# Patient Record
Sex: FEMALE | Race: WHITE | Employment: FULL TIME | URBAN - METROPOLITAN AREA
[De-identification: names, ages, dates, MRNs, and addresses within clinical notes are randomized per-mention and may not be internally consistent; named-entity substitution may affect disease eponyms.]

---

## 2023-04-27 ENCOUNTER — OFFICE VISIT (OUTPATIENT)
Dept: ONCOLOGY | Age: 23
End: 2023-04-27
Payer: COMMERCIAL

## 2023-04-27 VITALS
TEMPERATURE: 98 F | HEIGHT: 68 IN | BODY MASS INDEX: 26.22 KG/M2 | SYSTOLIC BLOOD PRESSURE: 123 MMHG | DIASTOLIC BLOOD PRESSURE: 73 MMHG | RESPIRATION RATE: 18 BRPM | OXYGEN SATURATION: 98 % | HEART RATE: 87 BPM | WEIGHT: 173 LBS

## 2023-04-27 DIAGNOSIS — R79.89 ABNORMAL CBC: Primary | ICD-10-CM

## 2023-04-27 PROCEDURE — 99204 OFFICE O/P NEW MOD 45 MIN: CPT | Performed by: STUDENT IN AN ORGANIZED HEALTH CARE EDUCATION/TRAINING PROGRAM

## 2023-04-27 RX ORDER — ALBUTEROL SULFATE 1.25 MG/3ML
1 SOLUTION RESPIRATORY (INHALATION)
COMMUNITY

## 2023-04-27 RX ORDER — NORETHINDRONE ACETATE AND ETHINYL ESTRADIOL 1MG-20(21)
1 KIT ORAL DAILY
COMMUNITY

## 2023-04-27 RX ORDER — FLUTICASONE PROPIONATE AND SALMETEROL XINAFOATE 115; 21 UG/1; UG/1
2 AEROSOL, METERED RESPIRATORY (INHALATION) 2 TIMES DAILY
COMMUNITY

## 2023-04-27 RX ORDER — IRON POLYSACCHARIDE COMPLEX 150 MG
300 CAPSULE ORAL DAILY
COMMUNITY

## 2023-04-27 RX ORDER — BUSPIRONE HYDROCHLORIDE 7.5 MG/1
1 TABLET ORAL 2 TIMES DAILY
COMMUNITY
Start: 2023-04-13

## 2023-04-27 RX ORDER — DOXYCYCLINE HYCLATE 100 MG
100 TABLET ORAL 2 TIMES DAILY
Qty: 14 TABLET | Refills: 0 | COMMUNITY
Start: 2023-04-21 | End: 2023-04-28

## 2023-04-27 RX ORDER — MAGNESIUM 250 MG
250 TABLET ORAL
COMMUNITY

## 2023-04-27 RX ORDER — IBUPROFEN 800 MG/1
TABLET ORAL
COMMUNITY
Start: 2023-02-10

## 2023-04-27 RX ORDER — ONDANSETRON 4 MG/1
TABLET, ORALLY DISINTEGRATING ORAL
COMMUNITY
Start: 2023-02-06

## 2023-04-27 NOTE — PROGRESS NOTES
Rosanna Pereyra is a 23 y.o. female who presents for   Chief Complaint   Patient presents with    New Patient     BASHIR       The patient reports fatigue, and recovering from a sinus infection. Pt last iron infusion was in November. 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 Pt believes she's tired from that. Pt reports numbness and tingling in hands and easy bruising which she believes has gotten worse. Pt does shave symptoms of dizziness occasionally. But denies having it today. Pt hasn't had a menstrual period in a while and has had cysts was put on birth control and even when stopping hasn't receive period again. Medications reviewed with the patient, and chart updated to reflect changes. Review of Systems   Constitutional:  Positive for malaise/fatigue. HENT: Negative. Eyes: Negative. Respiratory: Negative. Gastrointestinal: Negative. Genitourinary: Negative. Musculoskeletal: Negative. Skin: Negative. Neurological:  Positive for tingling. Endo/Heme/Allergies:  Bruises/bleeds easily. Psychiatric/Behavioral: Negative.

## 2023-04-27 NOTE — PROGRESS NOTES
[address] [facility] [place] at [address] [place] [facility], [place], [address]  W: [phone] F: [phone]      Reason for Visit:   Luda Elizabeth is a 23 y.o. female who is seen in consultation at the request of Dr. 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 Hematology / Oncology Treatment History:     Hematological/Oncological Diagnosis: History of severe anemia, multifactorial due to iron deficiency and suspected AIHA    Date of Diagnosis: 10/2022    Treatment course: Treated with IV iron in [place]       History of Present Illness:     23year old female with a relevant medical history of autoimmune disease, hashimoto's disease, SLE, history of iron deficiency anemia. She reportedly recently moved from [place] where she was seen to have a history of iron deficiency and severe anemia back in 2019. The patient reported at one time she was hospitalized with severe anemia requiring blood transfusion x 2. Unclear cause of anemia as the patient denies any heavy menses. No other new clinical changes or current symptoms. Family history reviewed, non contributory  Social history reviewed, also non contributory      Review of Systems: A complete review of systems was obtained, negative except as described above.     Past Medical History:   Diagnosis Date    Anemia     Asthma     2001    Hashimoto's disease     2007    Lupus ([address]     PTSD (post-traumatic stress disorder)       Past Surgical History:   Procedure Laterality Date    HX TONSILLECTOMY      2016      Social History     Tobacco Use    Smoking status: Never    Smokeless tobacco: Never   Substance Use Topics    Alcohol use: Not Currently      Family History   Problem Relation Age of Onset    Hypertension Mother     Thyroid Disease Mother     Anxiety Brother     Diabetes Maternal Aunt     Diabetes Maternal Grandfather     Type I Diabetes Maternal Grandfather     Arthritis-rheumatoid Paternal Grandmother      Current Outpatient Medications   Medication Sig    albuterol (ACCUNEB) 1.25 mg/3 mL nebu Take 3 mL by inhalation every six (6) hours as needed. doxycycline (VIBRA-TABS) 100 mg tablet Take 1 Tablet by mouth two (2) times a day. fluticasone propion-salmeteroL (ADVAIR HFA) 115-21 mcg/actuation inhaler Take 2 Puffs by inhalation two (2) times a day. iron polysaccharides (NIFEREX) 150 mg iron capsule Take 2 Capsules by mouth daily. magnesium 250 mg tab Take 1 Tablet by mouth. norethindrone-ethinyl estradiol (JUNEL FE 1/20) 1 mg-20 mcg (21)/75 mg (7) tab Take 1 Tablet by mouth daily. ondansetron (ZOFRAN ODT) 4 mg disintegrating tablet TAKE 1 TABLET BY MOUTH 3 TIMES PER DAY ( AS NEEDED FOR NAUSEA)    sulfaSALAzine (AZULFIDINE) 500 mg tablet Take 1 Tablet by mouth four (4) times daily. hydrOXYchloroQUINE (PLAQUENIL) 200 mg tablet Take 2 Tablets by mouth daily. liothyronine (CYTOMEL) 5 mcg tablet Take 2 Tablets by mouth daily. Synthroid 112 mcg tablet Take 1 Tablet by mouth daily. Junel 1.5/30, 21, 1.5-30 mg-mcg tab     propranoloL (INDERAL) 10 mg tablet TAKE 1 TABLET BY MOUTH ONCE A DAY AS NEEDED FOR ANXIETY/PANIC ATTACKS    DULoxetine 40 mg cpDR Take 1 Capsule by mouth daily. predniSONE (DELTASONE) 5 mg tablet Take  by mouth as needed. busPIRone (BUSPAR) 7.5 mg tablet Take 1 Tablet by mouth two (2) times a day. (Patient not taking: Reported on 4/27/2023)    ibuprofen (MOTRIN) 800 mg tablet TAKE 1 TABLET (ORAL) 4 TIMES PER DAY ( AS NEEDED - HEADACHE) (Patient not taking: Reported on 4/27/2023)     No current facility-administered medications for this visit.       Allergies   Allergen Reactions    Acetaminophen Anaphylaxis and Swelling     Mouth and throat swell    Lorain Anaphylaxis            Physical Exam:   Visit Vitals  /73 (BP 1 Location: Left upper arm, BP Patient Position: Sitting)   Pulse 87   Temp 98 °F (36.7 °C) (Oral)   Resp 18   Ht 5' 8\" (1.727 m)   Wt 173 lb (78.5 kg)   SpO2 98%   BMI 26.30 kg/m²     ECOG PS: 0  General: No distress  Eyes: PERRLA, anicteric sclerae  HENT: Atraumatic with normal appearance of ears and nose; OP clear  Neck: Supple; no visualized JVD  Lymphatic: No cervical, or supraclavicular lymphadenopathy. Respiratory: CTAB, normal respiratory effort  CV: Normal rate, regular rhythm, no murmurs, no peripheral edema  GI: Soft, nontender, nondistended, no palpable masses, no hepatomegaly, no splenomegaly  MS: Normal gait and station. Digits without clubbing or cyanosis. Skin: No rashes, ecchymoses, or petechiae. Normal temperature, turgor, and texture. Neuro/Psych: Alert, oriented, appropriate affect, normal judgment/insight      Results:     Labs from OSH reviewed, notable for normal Hg. No iron studies on record. Assessment and Recommendations:     # History of severe anemia, multifactorial    - given history of SLE, Hashimoto's, I am concerned that she may have had episodes of autoimmune hemolysis as the primary  of her anemia. She is not currently having any flares of her lupus, but she does have symptoms of fatigue.     - plan to check CBC, iron profile today. Will also evaluate for signs of hemolysis with LDH, reticulocyte count, MAYELIN, haptoglobin. - She is currently taking oral iron and should continue as tolerated. - plan for follow up in 2 weeks to discuss results. If normal, would plan for close follow up every 3-6 months as AIHA can occur suddenly when associated with lupus flares. She is currently taking multiple medications to control her autoimmune disease.          Signed By: Yoel Larose MD      Attending Medical Oncologist   [facility]

## 2023-05-08 ENCOUNTER — TELEPHONE (OUTPATIENT)
Age: 23
End: 2023-05-08

## 2023-05-08 DIAGNOSIS — R79.89 OTHER SPECIFIED ABNORMAL FINDINGS OF BLOOD CHEMISTRY: Primary | ICD-10-CM

## 2023-05-08 NOTE — TELEPHONE ENCOUNTER
I s/d the patient's follow up for 2 weeks and the patient is asking for her lab results. 5/15/23, I have her s/d at [facility] with Dr Brent Galicia.

## 2023-05-08 NOTE — TELEPHONE ENCOUNTER
Called patient. HIPAA verified by two patient identifiers. Informed patient that CBC and peripheral smear were not done. Will reorder and mail lab slip to patient. She will get labs done at [facility] a few days prior to her follow-up appointment with Dr. John Rebollar on 5/22/23. Patient in agreement.

## 2023-05-09 ENCOUNTER — TELEPHONE (OUTPATIENT)
Age: 23
End: 2023-05-09

## 2023-05-11 ENCOUNTER — TELEPHONE (OUTPATIENT)
Age: 23
End: 2023-05-11

## 2023-05-11 DIAGNOSIS — M32.9 LUPUS (HCC): Primary | ICD-10-CM

## 2023-05-11 DIAGNOSIS — D64.9 FATIGUE ASSOCIATED WITH ANEMIA: ICD-10-CM

## 2023-05-11 NOTE — TELEPHONE ENCOUNTER
Pt called and left a VM on the nurse line stating she has reached out to her PCP to have them send out a Rheumatologist referral as discussed in the last visit with Dr Missy Cummings but her PCP is out of the office for at least another week or week and a half. SO she is asking is there any way she can take Dr Missy Cummings up on his offer and have him put in a referral for her to see a Rheumatologist asap since she is having a lot of issues.  She can be reached at [phone]

## 2023-05-14 DIAGNOSIS — D50.9 IRON DEFICIENCY ANEMIA, UNSPECIFIED IRON DEFICIENCY ANEMIA TYPE: Primary | ICD-10-CM

## 2023-05-16 ENCOUNTER — TELEPHONE (OUTPATIENT)
Age: 23
End: 2023-05-16

## 2023-05-16 DIAGNOSIS — M32.9 SYSTEMIC LUPUS ERYTHEMATOSUS, UNSPECIFIED SLE TYPE, UNSPECIFIED ORGAN INVOLVEMENT STATUS (HCC): Primary | ICD-10-CM

## 2023-05-16 NOTE — TELEPHONE ENCOUNTER
verified. Informed pt of message from provider regarding referral and recommendations below:     Referral to rheumatology placed. She can also try and call these places for a rheumatologist:     [facility]   [phone]     [facility]   [phone] [address] [facility]   [phone]        Pt verified understanding and had no further questions.

## 2023-05-16 NOTE — TELEPHONE ENCOUNTER
verified. Pt states Dr Nakita Root wanted PCP provider to put in a referral to see a rheumatologist. There was a referral put in but for a provider in [place] and Dr Nakita Root states PCP should put in new referral for rheumatology for patient.

## 2023-05-17 ENCOUNTER — TELEPHONE (OUTPATIENT)
Age: 23
End: 2023-05-17

## 2023-05-18 ENCOUNTER — TELEPHONE (OUTPATIENT)
Age: 23
End: 2023-05-18

## 2023-05-18 LAB
ALBUMIN SERPL-MCNC: 4.7 G/DL (ref 3.9–5)
ALBUMIN/GLOB SERPL: 2.6 {RATIO} (ref 1.2–2.2)
ALP SERPL-CCNC: 48 IU/L (ref 44–121)
ALT SERPL-CCNC: 14 IU/L (ref 0–32)
AST SERPL-CCNC: 26 IU/L (ref 0–40)
BASOPHILS # BLD AUTO: 0 X10E3/UL (ref 0–0.2)
BASOPHILS NFR BLD AUTO: 1 %
BILIRUB SERPL-MCNC: 0.3 MG/DL (ref 0–1.2)
BUN SERPL-MCNC: 8 MG/DL (ref 6–20)
BUN/CREAT SERPL: 16 (ref 9–23)
CALCIUM SERPL-MCNC: 9.2 MG/DL (ref 8.7–10.2)
CHLORIDE SERPL-SCNC: 95 MMOL/L (ref 96–106)
CO2 SERPL-SCNC: 23 MMOL/L (ref 20–29)
CREAT SERPL-MCNC: 0.5 MG/DL (ref 0.57–1)
DAT POLY-SP REAG RBC QL: NEGATIVE
EGFRCR SERPLBLD CKD-EPI 2021: 134 ML/MIN/1.73
EOSINOPHIL # BLD AUTO: 0 X10E3/UL (ref 0–0.4)
EOSINOPHIL NFR BLD AUTO: 0 %
ERYTHROCYTE [DISTWIDTH] IN BLOOD BY AUTOMATED COUNT: 11.9 % (ref 11.7–15.4)
FERRITIN SERPL-MCNC: 29 NG/ML (ref 15–150)
GLOBULIN SER CALC-MCNC: 1.8 G/DL (ref 1.5–4.5)
GLUCOSE SERPL-MCNC: 92 MG/DL (ref 70–99)
HAPTOGLOB SERPL-MCNC: 24 MG/DL (ref 33–278)
HCT VFR BLD AUTO: 44.2 % (ref 34–46.6)
HGB BLD-MCNC: 15.2 G/DL (ref 11.1–15.9)
IMM GRANULOCYTES # BLD AUTO: 0 X10E3/UL (ref 0–0.1)
IMM GRANULOCYTES NFR BLD AUTO: 0 %
IRON SATN MFR SERPL: 19 % (ref 15–55)
IRON SERPL-MCNC: 82 UG/DL (ref 27–159)
LDH SERPL L TO P-CCNC: 199 IU/L (ref 119–226)
LYMPHOCYTES # BLD AUTO: 2 X10E3/UL (ref 0.7–3.1)
LYMPHOCYTES NFR BLD AUTO: 43 %
MCH RBC QN AUTO: 31.7 PG (ref 26.6–33)
MCHC RBC AUTO-ENTMCNC: 34.4 G/DL (ref 31.5–35.7)
MCV RBC AUTO: 92 FL (ref 79–97)
MONOCYTES # BLD AUTO: 0.6 X10E3/UL (ref 0.1–0.9)
MONOCYTES NFR BLD AUTO: 13 %
NEUTROPHILS # BLD AUTO: 2 X10E3/UL (ref 1.4–7)
NEUTROPHILS NFR BLD AUTO: 43 %
PLATELET # BLD AUTO: 240 X10E3/UL (ref 150–450)
POTASSIUM SERPL-SCNC: 4.6 MMOL/L (ref 3.5–5.2)
PROT SERPL-MCNC: 6.5 G/DL (ref 6–8.5)
RBC # BLD AUTO: 4.79 X10E6/UL (ref 3.77–5.28)
RETICS/RBC NFR AUTO: 1.9 % (ref 0.6–2.6)
SODIUM SERPL-SCNC: 134 MMOL/L (ref 134–144)
TIBC SERPL-MCNC: 434 UG/DL (ref 250–450)
UIBC SERPL-MCNC: 352 UG/DL (ref 131–425)
WBC # BLD AUTO: 4.5 X10E3/UL (ref 3.4–10.8)

## 2023-05-18 NOTE — TELEPHONE ENCOUNTER
Please fax over lab order to [facility] on [address]. Patient is waiting there. Please call as soon as you can.

## 2023-05-22 ENCOUNTER — OFFICE VISIT (OUTPATIENT)
Age: 23
End: 2023-05-22
Payer: COMMERCIAL

## 2023-05-22 VITALS
RESPIRATION RATE: 18 BRPM | HEART RATE: 82 BPM | WEIGHT: 165 LBS | OXYGEN SATURATION: 98 % | BODY MASS INDEX: 25.01 KG/M2 | SYSTOLIC BLOOD PRESSURE: 139 MMHG | TEMPERATURE: 98 F | HEIGHT: 68 IN | DIASTOLIC BLOOD PRESSURE: 85 MMHG

## 2023-05-22 DIAGNOSIS — D59.10 AIHA (AUTOIMMUNE HEMOLYTIC ANEMIA) (HCC): Primary | ICD-10-CM

## 2023-05-22 PROCEDURE — 99214 OFFICE O/P EST MOD 30 MIN: CPT | Performed by: STUDENT IN AN ORGANIZED HEALTH CARE EDUCATION/TRAINING PROGRAM

## 2023-05-22 RX ORDER — NORETHINDRONE ACETATE AND ETHINYL ESTRADIOL 1MG-20(21)
1 KIT ORAL DAILY
COMMUNITY

## 2023-05-22 RX ORDER — DULOXETIN HYDROCHLORIDE 30 MG/1
30 CAPSULE, DELAYED RELEASE ORAL DAILY
COMMUNITY
Start: 2018-07-06

## 2023-05-22 RX ORDER — IRON POLYSACCHARIDE COMPLEX 150 MG
300 CAPSULE ORAL DAILY
COMMUNITY

## 2023-05-22 RX ORDER — HYDROXYCHLOROQUINE SULFATE 200 MG/1
TABLET, FILM COATED ORAL
COMMUNITY
Start: 2019-07-25

## 2023-05-22 RX ORDER — BUSPIRONE HYDROCHLORIDE 7.5 MG/1
7.5 TABLET ORAL 2 TIMES DAILY
COMMUNITY
Start: 2023-04-13

## 2023-05-22 RX ORDER — MULTIVITAMIN WITH IRON
250 TABLET ORAL DAILY
COMMUNITY

## 2023-05-22 RX ORDER — LEVOTHYROXINE SODIUM 112 UG/1
TABLET ORAL
COMMUNITY
Start: 2019-06-25

## 2023-05-22 RX ORDER — ALBUTEROL SULFATE 90 UG/1
2 AEROSOL, METERED RESPIRATORY (INHALATION) EVERY 6 HOURS PRN
COMMUNITY

## 2023-05-22 RX ORDER — SULFASALAZINE 500 MG/1
TABLET ORAL
COMMUNITY
Start: 2019-07-11

## 2023-05-22 RX ORDER — PROPRANOLOL HYDROCHLORIDE 10 MG/1
TABLET ORAL
COMMUNITY
Start: 2023-03-22

## 2023-05-22 RX ORDER — PANTOPRAZOLE SODIUM 40 MG/1
TABLET, DELAYED RELEASE ORAL EVERY 4 HOURS PRN
COMMUNITY
Start: 2018-08-20

## 2023-05-22 RX ORDER — LIOTHYRONINE SODIUM 5 UG/1
TABLET ORAL
COMMUNITY
Start: 2020-09-21

## 2023-05-22 ASSESSMENT — ENCOUNTER SYMPTOMS
EYES NEGATIVE: 1
ABDOMINAL PAIN: 1
ALLERGIC/IMMUNOLOGIC NEGATIVE: 1
RESPIRATORY NEGATIVE: 1

## 2023-05-22 NOTE — PROGRESS NOTES
Delice Rubinstein is a 23 y.o. female who presents for follow up of   Chief Complaint   Patient presents with    Follow-up     FEMI       The patient reports no new clinical symptoms or new complaints since last clinic evaluation. Pt. went to urgent care and has a kidney infection last week pt now on antibiotics. Pt has some abdominal 5/10 today and mild fatigue with some bruising. .  No interval surgery or procedures reported    No reported new medication changes reported       Medications reviewed with the patient, and chart updated to reflect changes. Review of Systems   Constitutional:  Positive for fatigue. HENT: Negative. Eyes: Negative. Respiratory: Negative. Cardiovascular: Negative. Gastrointestinal:  Positive for abdominal pain. Endocrine: Negative. Genitourinary: Negative. Musculoskeletal: Negative. Skin: Negative. Allergic/Immunologic: Negative. Hematological:  Bruises/bleeds easily. Psychiatric/Behavioral: Negative.
visualized mass   Resp: no respiratory distress   Neuro: no gross deficits   Skin: no discoloration or lesions of concern on visible areas   Psychiatric: normal affect, consistent with stated mood, no evidence of hallucinations               Results:       Lab Results   Component Value Date    WBC 4.5 05/17/2023    HGB 15.2 05/17/2023    HCT 44.2 05/17/2023    MCV 92 05/17/2023     05/17/2023     Lab Results   Component Value Date     05/17/2023    K 4.6 05/17/2023    CL 95 (L) 05/17/2023    CO2 23 05/17/2023    BUN 8 05/17/2023    CREATININE 0.50 (L) 05/17/2023    GLUCOSE 92 05/17/2023    CALCIUM 9.2 05/17/2023    PROT 6.5 05/17/2023    LABALBU 4.7 05/17/2023    BILITOT 0.3 05/17/2023    ALKPHOS 48 05/17/2023    AST 26 05/17/2023    ALT 14 05/17/2023    LABGLOM 134 05/17/2023    AGRATIO 2.6 (H) 05/17/2023       Lab Results   Component Value Date    RETICCTPCT 1.9 05/17/2023              Ref. Range & Units      Ref. Range & Units   05/17/2315:09   04/27/2310:50     Haptoglobin   33 - 278 mg/dL   Haptoglobin   33 - 278 mg/dL   24   <10          Assessment and Recommendations:        # History of severe anemia, multifactorial   - driven by hemolytic anemia and iron deficiency   - given history of SLE, Hashimoto's, I am concerned that she may have had episodes of autoimmune hemolysis as the primary  of her anemia. She is not currently having any flares of her lupus, but she does have symptoms of fatigue.       - Workup shows evidence of past hemolysis, but no active severe hemolysis. - Jose Alberto test is negative  - haptoglobin is uptrending.     - iron studies show borderline iron deficiency,       - She is currently taking oral iron and should continue as tolerated. - plan for close follow up every 3-6 months as AIHA can occur suddenly when associated with lupus flares.   She is currently taking multiple medications to control her autoimmune disease.      - Follow up in 3 months with repeat

## 2023-06-06 ENCOUNTER — OFFICE VISIT (OUTPATIENT)
Age: 23
End: 2023-06-06
Payer: COMMERCIAL

## 2023-06-06 VITALS
HEART RATE: 83 BPM | RESPIRATION RATE: 18 BRPM | WEIGHT: 164.4 LBS | BODY MASS INDEX: 24.92 KG/M2 | DIASTOLIC BLOOD PRESSURE: 79 MMHG | HEIGHT: 68 IN | OXYGEN SATURATION: 98 % | SYSTOLIC BLOOD PRESSURE: 115 MMHG | TEMPERATURE: 98.7 F

## 2023-06-06 DIAGNOSIS — R55 SYNCOPE, UNSPECIFIED SYNCOPE TYPE: ICD-10-CM

## 2023-06-06 DIAGNOSIS — E06.3 AUTOIMMUNE THYROIDITIS: ICD-10-CM

## 2023-06-06 DIAGNOSIS — K56.7 ILEUS (HCC): ICD-10-CM

## 2023-06-06 DIAGNOSIS — Z09 HOSPITAL DISCHARGE FOLLOW-UP: Primary | ICD-10-CM

## 2023-06-06 DIAGNOSIS — D50.9 IRON DEFICIENCY ANEMIA, UNSPECIFIED IRON DEFICIENCY ANEMIA TYPE: ICD-10-CM

## 2023-06-06 PROCEDURE — 99214 OFFICE O/P EST MOD 30 MIN: CPT

## 2023-06-06 PROCEDURE — 93000 ELECTROCARDIOGRAM COMPLETE: CPT

## 2023-06-06 RX ORDER — PREDNISONE 5 MG/1
TABLET ORAL PRN
COMMUNITY

## 2023-06-06 RX ORDER — PROPRANOLOL HYDROCHLORIDE 10 MG/1
TABLET ORAL
Qty: 90 TABLET | Refills: 0 | Status: SHIPPED | OUTPATIENT
Start: 2023-06-06

## 2023-06-06 RX ORDER — PSEUDOEPHEDRINE HCL 30 MG
1 TABLET ORAL 2 TIMES DAILY
COMMUNITY
Start: 2023-05-31 | End: 2023-06-30

## 2023-06-06 ASSESSMENT — ENCOUNTER SYMPTOMS
DIARRHEA: 0
CONSTIPATION: 0
APNEA: 0
CHEST TIGHTNESS: 0
WHEEZING: 0
RHINORRHEA: 0
ABDOMINAL PAIN: 0
SINUS PRESSURE: 0
EYE ITCHING: 0
VOMITING: 0
BLOOD IN STOOL: 0
STRIDOR: 0
ABDOMINAL DISTENTION: 0
SORE THROAT: 0
TROUBLE SWALLOWING: 0
NAUSEA: 0
SHORTNESS OF BREATH: 0
EYE REDNESS: 0
COUGH: 0
FACIAL SWELLING: 0
EYE PAIN: 0
SINUS PAIN: 0
VOICE CHANGE: 0
PHOTOPHOBIA: 0
EYE DISCHARGE: 0

## 2023-06-06 ASSESSMENT — ANXIETY QUESTIONNAIRES
2. NOT BEING ABLE TO STOP OR CONTROL WORRYING: 0
4. TROUBLE RELAXING: 0
1. FEELING NERVOUS, ANXIOUS, OR ON EDGE: 1
5. BEING SO RESTLESS THAT IT IS HARD TO SIT STILL: 0
GAD7 TOTAL SCORE: 3
IF YOU CHECKED OFF ANY PROBLEMS ON THIS QUESTIONNAIRE, HOW DIFFICULT HAVE THESE PROBLEMS MADE IT FOR YOU TO DO YOUR WORK, TAKE CARE OF THINGS AT HOME, OR GET ALONG WITH OTHER PEOPLE: NOT DIFFICULT AT ALL
6. BECOMING EASILY ANNOYED OR IRRITABLE: 1
7. FEELING AFRAID AS IF SOMETHING AWFUL MIGHT HAPPEN: 0
3. WORRYING TOO MUCH ABOUT DIFFERENT THINGS: 1

## 2023-06-06 ASSESSMENT — PATIENT HEALTH QUESTIONNAIRE - PHQ9
SUM OF ALL RESPONSES TO PHQ QUESTIONS 1-9: 0
1. LITTLE INTEREST OR PLEASURE IN DOING THINGS: 0
SUM OF ALL RESPONSES TO PHQ9 QUESTIONS 1 & 2: 0
2. FEELING DOWN, DEPRESSED OR HOPELESS: 0

## 2023-06-06 NOTE — PATIENT INSTRUCTIONS
Rheumatologist:     [address] [facility]   [phone]     [facility]   [phone] [address] [facility]   [phone]

## 2023-06-06 NOTE — PROGRESS NOTES
Rm    Chief Complaint   Patient presents with    Follow-Up from [facility]     5/25 5/30         /79 (Site: Left Upper Arm, Position: Sitting, Cuff Size: Medium Adult)   Pulse 83   Temp 98.7 °F (37.1 °C) (Temporal)   Resp 18   Ht 5' 8\" (1.727 m)   Wt 164 lb 6.4 oz (74.6 kg)   SpO2 98%   BMI 25.00 kg/m²      1. Have you been to the ER, urgent care clinic since your last visit? Hospitalized since your last visit? Yes 5/25 5/30/23 [facility]    2. Have you seen or consulted any other health care providers outside of the [address] [place] [address] since your last visit? Include any pap smears or colon screening. no    Health Maintenance Due   Topic Date Due    COVID-19 Vaccine (1) Never done    HIV screen  Never done    Chlamydia/GC screen  Never done    DTaP/Tdap/Td vaccine (7 - Td or Tdap) 05/31/2020        No flowsheet data found. No flowsheet data found. No flowsheet data found.

## 2023-06-07 LAB
25(OH)D3+25(OH)D2 SERPL-MCNC: 26.1 NG/ML (ref 30–100)
ALBUMIN SERPL-MCNC: 4.6 G/DL (ref 3.9–5)
ALBUMIN/GLOB SERPL: 2.6 {RATIO} (ref 1.2–2.2)
ALP SERPL-CCNC: 51 IU/L (ref 44–121)
ALT SERPL-CCNC: 18 IU/L (ref 0–32)
AST SERPL-CCNC: 28 IU/L (ref 0–40)
BASOPHILS # BLD AUTO: 0 X10E3/UL (ref 0–0.2)
BASOPHILS NFR BLD AUTO: 1 %
BILIRUB SERPL-MCNC: 0.3 MG/DL (ref 0–1.2)
BUN SERPL-MCNC: 8 MG/DL (ref 6–20)
BUN/CREAT SERPL: 13 (ref 9–23)
CALCIUM SERPL-MCNC: 9.3 MG/DL (ref 8.7–10.2)
CHLORIDE SERPL-SCNC: 98 MMOL/L (ref 96–106)
CO2 SERPL-SCNC: 22 MMOL/L (ref 20–29)
CREAT SERPL-MCNC: 0.64 MG/DL (ref 0.57–1)
EGFRCR SERPLBLD CKD-EPI 2021: 126 ML/MIN/1.73
EOSINOPHIL # BLD AUTO: 0 X10E3/UL (ref 0–0.4)
EOSINOPHIL NFR BLD AUTO: 0 %
ERYTHROCYTE [DISTWIDTH] IN BLOOD BY AUTOMATED COUNT: 11.4 % (ref 11.7–15.4)
FERRITIN SERPL-MCNC: 33 NG/ML (ref 15–150)
FOLATE SERPL-MCNC: 19.9 NG/ML
GLOBULIN SER CALC-MCNC: 1.8 G/DL (ref 1.5–4.5)
GLUCOSE SERPL-MCNC: 83 MG/DL (ref 70–99)
HCT VFR BLD AUTO: 45.2 % (ref 34–46.6)
HGB BLD-MCNC: 15.4 G/DL (ref 11.1–15.9)
IMM GRANULOCYTES # BLD AUTO: 0 X10E3/UL (ref 0–0.1)
IMM GRANULOCYTES NFR BLD AUTO: 0 %
IRON SATN MFR SERPL: 36 % (ref 15–55)
IRON SERPL-MCNC: 150 UG/DL (ref 27–159)
LYMPHOCYTES # BLD AUTO: 1.3 X10E3/UL (ref 0.7–3.1)
LYMPHOCYTES NFR BLD AUTO: 29 %
MCH RBC QN AUTO: 31.8 PG (ref 26.6–33)
MCHC RBC AUTO-ENTMCNC: 34.1 G/DL (ref 31.5–35.7)
MCV RBC AUTO: 93 FL (ref 79–97)
MONOCYTES # BLD AUTO: 0.4 X10E3/UL (ref 0.1–0.9)
MONOCYTES NFR BLD AUTO: 10 %
NEUTROPHILS # BLD AUTO: 2.7 X10E3/UL (ref 1.4–7)
NEUTROPHILS NFR BLD AUTO: 60 %
PLATELET # BLD AUTO: 277 X10E3/UL (ref 150–450)
POTASSIUM SERPL-SCNC: 4.4 MMOL/L (ref 3.5–5.2)
PROT SERPL-MCNC: 6.4 G/DL (ref 6–8.5)
RBC # BLD AUTO: 4.85 X10E6/UL (ref 3.77–5.28)
SODIUM SERPL-SCNC: 136 MMOL/L (ref 134–144)
TIBC SERPL-MCNC: 412 UG/DL (ref 250–450)
TSH SERPL DL<=0.005 MIU/L-ACNC: 1.53 UIU/ML (ref 0.45–4.5)
UIBC SERPL-MCNC: 262 UG/DL (ref 131–425)
VIT B12 SERPL-MCNC: 347 PG/ML (ref 232–1245)
WBC # BLD AUTO: 4.5 X10E3/UL (ref 3.4–10.8)

## 2023-06-30 ENCOUNTER — TELEPHONE (OUTPATIENT)
Age: 23
End: 2023-06-30

## 2023-06-30 ENCOUNTER — OFFICE VISIT (OUTPATIENT)
Age: 23
End: 2023-06-30

## 2023-06-30 VITALS
SYSTOLIC BLOOD PRESSURE: 128 MMHG | TEMPERATURE: 98.2 F | RESPIRATION RATE: 16 BRPM | WEIGHT: 171 LBS | HEART RATE: 83 BPM | HEIGHT: 68 IN | OXYGEN SATURATION: 98 % | BODY MASS INDEX: 25.91 KG/M2 | DIASTOLIC BLOOD PRESSURE: 81 MMHG

## 2023-06-30 DIAGNOSIS — M32.9 SYSTEMIC LUPUS ERYTHEMATOSUS, UNSPECIFIED SLE TYPE, UNSPECIFIED ORGAN INVOLVEMENT STATUS (HCC): ICD-10-CM

## 2023-06-30 DIAGNOSIS — T50.905A ADVERSE EFFECT OF DRUG, INITIAL ENCOUNTER: Primary | ICD-10-CM

## 2023-06-30 DIAGNOSIS — J01.80 ACUTE NON-RECURRENT SINUSITIS OF OTHER SINUS: ICD-10-CM

## 2023-06-30 DIAGNOSIS — M54.50 BILATERAL LOW BACK PAIN, UNSPECIFIED CHRONICITY, UNSPECIFIED WHETHER SCIATICA PRESENT: ICD-10-CM

## 2023-06-30 LAB
BILIRUBIN, URINE, POC: NEGATIVE
BLOOD URINE, POC: NEGATIVE
GLUCOSE URINE, POC: NEGATIVE
KETONES, URINE, POC: NEGATIVE
LEUKOCYTE ESTERASE, URINE, POC: NEGATIVE
NITRITE, URINE, POC: NEGATIVE
PH, URINE, POC: 6.5 (ref 4.6–8)
PROTEIN,URINE, POC: NEGATIVE
SPECIFIC GRAVITY, URINE, POC: 1.01 (ref 1–1.03)
URINALYSIS CLARITY, POC: CLEAR
URINALYSIS COLOR, POC: YELLOW
UROBILINOGEN, POC: NORMAL

## 2023-06-30 RX ORDER — DOXYCYCLINE HYCLATE 100 MG
100 TABLET ORAL 2 TIMES DAILY
Qty: 10 TABLET | Refills: 0 | Status: SHIPPED | OUTPATIENT
Start: 2023-06-30 | End: 2023-07-05

## 2023-06-30 RX ORDER — PREDNISONE 5 MG/1
TABLET ORAL
Qty: 1 EACH | Refills: 0 | Status: SHIPPED | OUTPATIENT
Start: 2023-06-30

## 2023-06-30 ASSESSMENT — ENCOUNTER SYMPTOMS
WHEEZING: 0
EYE PAIN: 0
RHINORRHEA: 0
APNEA: 0
PHOTOPHOBIA: 0
DIARRHEA: 0
COUGH: 0
CHOKING: 0
SHORTNESS OF BREATH: 0
BACK PAIN: 1
SORE THROAT: 0
SINUS PAIN: 0
TROUBLE SWALLOWING: 0
EYE ITCHING: 0
EYE REDNESS: 0
FACIAL SWELLING: 0
STRIDOR: 0
EYE DISCHARGE: 0
VOMITING: 0
ABDOMINAL PAIN: 0
NAUSEA: 0
CONSTIPATION: 0
VOICE CHANGE: 0
CHEST TIGHTNESS: 0
SINUS PRESSURE: 1

## 2023-07-24 ENCOUNTER — TELEPHONE (OUTPATIENT)
Age: 23
End: 2023-07-24

## 2023-07-24 DIAGNOSIS — F41.9 ANXIETY DISORDER, UNSPECIFIED TYPE: Primary | ICD-10-CM

## 2023-07-24 RX ORDER — DULOXETINE 40 MG/1
40 CAPSULE, DELAYED RELEASE ORAL DAILY
Qty: 30 CAPSULE | Refills: 0 | Status: SHIPPED | OUTPATIENT
Start: 2023-07-24

## 2023-07-24 NOTE — TELEPHONE ENCOUNTER
Pt is calling stating her psychiatrist is out of the country and she needs her med and she is wondering if you could refill her med    Cymbalta 40 mg

## 2023-08-09 ENCOUNTER — OFFICE VISIT (OUTPATIENT)
Age: 23
End: 2023-08-09
Payer: COMMERCIAL

## 2023-08-09 VITALS — BODY MASS INDEX: 26.18 KG/M2 | WEIGHT: 172.7 LBS | HEIGHT: 68 IN

## 2023-08-09 DIAGNOSIS — N91.2 AMENORRHEA: ICD-10-CM

## 2023-08-09 DIAGNOSIS — E03.9 HYPOTHYROIDISM, UNSPECIFIED TYPE: Primary | ICD-10-CM

## 2023-08-09 DIAGNOSIS — Z83.3 FAMILY HISTORY OF TYPE 1 DIABETES MELLITUS: ICD-10-CM

## 2023-08-09 DIAGNOSIS — E03.9 HYPOTHYROIDISM, UNSPECIFIED TYPE: ICD-10-CM

## 2023-08-09 PROCEDURE — 99204 OFFICE O/P NEW MOD 45 MIN: CPT | Performed by: INTERNAL MEDICINE

## 2023-08-09 RX ORDER — ONDANSETRON 4 MG/1
TABLET, ORALLY DISINTEGRATING ORAL
COMMUNITY
Start: 2023-07-20

## 2023-08-09 RX ORDER — NORETHINDRONE ACETATE AND ETHINYL ESTRADIOL 1.5; 3 MG/1; UG/1
1 TABLET ORAL DAILY
COMMUNITY
Start: 2023-08-01

## 2023-08-09 RX ORDER — LEVOTHYROXINE SODIUM 112 UG/1
112 TABLET ORAL EVERY MORNING
Qty: 90 TABLET | Refills: 3 | Status: SHIPPED | OUTPATIENT
Start: 2023-08-09

## 2023-08-09 RX ORDER — LIOTHYRONINE SODIUM 5 UG/1
10 TABLET ORAL DAILY
Qty: 180 TABLET | Refills: 2 | Status: SHIPPED | OUTPATIENT
Start: 2023-08-09

## 2023-08-09 RX ORDER — LIOTHYRONINE SODIUM 5 UG/1
TABLET ORAL
Qty: 30 TABLET | Status: CANCELLED | OUTPATIENT
Start: 2023-08-09

## 2023-08-09 RX ORDER — LEVOTHYROXINE SODIUM 112 UG/1
TABLET ORAL
Qty: 30 TABLET | Status: CANCELLED | OUTPATIENT
Start: 2023-08-09

## 2023-08-09 NOTE — PROGRESS NOTES
Chief Complaint   Patient presents with    Thyroid Problem    New Patient    Medication Refill     History of Present Illness: Bob Mcgraw is a 23 y.o. female with a past medical history significant for Hashimoto's thyroiditis, lupus, anemia seen for discussion related to Hashimoto's. \"Livier\"    Avril was diagnosed with Hashimoto's hypothyroidism at the age of 4. Currently taking namebrand Synthroid pending $140 per month. There is a very strong family history of type 1 diabetes in maternal aunt, maternal grandfather, maternal grandfather's brother. Additionally, gerson has not experienced a menstrual period since the age of 10 or 14. Menarche occurred at age 13, initially regular but became very heavy with bad cramping. In college experienced an ovarian cyst and was begun on continuous OCP, currently taking [place] still did have ovarian cyst despite the use of this. Dose has been increased since that time. Denies hirsutism or acne. Mother experienced menopause in her late 45s. Maternal aunt did have ovarian cysts. In addition to Synthroid does take 10 mcg of Cytomel. Has been taking intermittent doses of prednisone for lupus flares, will be seeing new rheumatologist on the 14th.       Past Medical History:   Diagnosis Date    Anemia     Asthma     2001    Hashimoto's disease     2007    Lupus ([address])     PTSD (post-traumatic stress disorder)      Past Surgical History:   Procedure Laterality Date    TONSILLECTOMY      2016     Current Outpatient Medications   Medication Sig    ondansetron (ZOFRAN-ODT) 4 MG disintegrating tablet TAKE 1 TABLET (4 MG TOTAL) BY MOUTH EVERY 8 HOURS AS NEEDED FOR NAUSEA AND VOMITING UP TO 30 DOSES    JUNEL 1.5/30 1.5-30 MG-MCG TABS Take 1 tablet by mouth daily    DULoxetine 40 MG CPEP Take 40 mg by mouth daily    predniSONE 5 MG (21) TBPK See administration instruction    predniSONE (DELTASONE) 5 MG tablet Take by mouth as needed    propranolol (INDERAL) 10 MG tablet TAKE 1

## 2023-08-22 DIAGNOSIS — D59.10 AIHA (AUTOIMMUNE HEMOLYTIC ANEMIA) (HCC): ICD-10-CM

## 2023-08-24 LAB
ALBUMIN SERPL-MCNC: 4.5 G/DL (ref 4–5)
ALBUMIN/GLOB SERPL: 2.6 {RATIO} (ref 1.2–2.2)
ALP SERPL-CCNC: 56 IU/L (ref 44–121)
ALT SERPL-CCNC: 12 IU/L (ref 0–32)
AST SERPL-CCNC: 22 IU/L (ref 0–40)
BASOPHILS # BLD AUTO: 0 X10E3/UL (ref 0–0.2)
BASOPHILS NFR BLD AUTO: 1 %
BILIRUB SERPL-MCNC: 0.3 MG/DL (ref 0–1.2)
BUN SERPL-MCNC: 10 MG/DL (ref 6–20)
BUN/CREAT SERPL: 17 (ref 9–23)
CALCIUM SERPL-MCNC: 9.1 MG/DL (ref 8.7–10.2)
CHLORIDE SERPL-SCNC: 101 MMOL/L (ref 96–106)
CO2 SERPL-SCNC: 22 MMOL/L (ref 20–29)
CREAT SERPL-MCNC: 0.58 MG/DL (ref 0.57–1)
EGFRCR SERPLBLD CKD-EPI 2021: 130 ML/MIN/1.73
EOSINOPHIL # BLD AUTO: 0 X10E3/UL (ref 0–0.4)
EOSINOPHIL NFR BLD AUTO: 0 %
ERYTHROCYTE [DISTWIDTH] IN BLOOD BY AUTOMATED COUNT: 11.5 % (ref 11.7–15.4)
FERRITIN SERPL-MCNC: 21 NG/ML (ref 15–150)
GLOBULIN SER CALC-MCNC: 1.7 G/DL (ref 1.5–4.5)
GLUCOSE SERPL-MCNC: 93 MG/DL (ref 70–99)
HAPTOGLOB SERPL-MCNC: 14 MG/DL (ref 33–278)
HCT VFR BLD AUTO: 45.6 % (ref 34–46.6)
HGB BLD-MCNC: 15.4 G/DL (ref 11.1–15.9)
IMM GRANULOCYTES # BLD AUTO: 0 X10E3/UL (ref 0–0.1)
IMM GRANULOCYTES NFR BLD AUTO: 0 %
IRON SATN MFR SERPL: 16 % (ref 15–55)
IRON SERPL-MCNC: 65 UG/DL (ref 27–159)
LDH SERPL L TO P-CCNC: 211 IU/L (ref 119–226)
LYMPHOCYTES # BLD AUTO: 1.3 X10E3/UL (ref 0.7–3.1)
LYMPHOCYTES NFR BLD AUTO: 33 %
MCH RBC QN AUTO: 31.2 PG (ref 26.6–33)
MCHC RBC AUTO-ENTMCNC: 33.8 G/DL (ref 31.5–35.7)
MCV RBC AUTO: 93 FL (ref 79–97)
MONOCYTES # BLD AUTO: 0.5 X10E3/UL (ref 0.1–0.9)
MONOCYTES NFR BLD AUTO: 12 %
NEUTROPHILS # BLD AUTO: 2.1 X10E3/UL (ref 1.4–7)
NEUTROPHILS NFR BLD AUTO: 54 %
PLATELET # BLD AUTO: 255 X10E3/UL (ref 150–450)
POTASSIUM SERPL-SCNC: 4.6 MMOL/L (ref 3.5–5.2)
PROT SERPL-MCNC: 6.2 G/DL (ref 6–8.5)
RBC # BLD AUTO: 4.93 X10E6/UL (ref 3.77–5.28)
RETICS/RBC NFR AUTO: 2.2 % (ref 0.6–2.6)
SODIUM SERPL-SCNC: 138 MMOL/L (ref 134–144)
TIBC SERPL-MCNC: 405 UG/DL (ref 250–450)
UIBC SERPL-MCNC: 340 UG/DL (ref 131–425)
WBC # BLD AUTO: 4 X10E3/UL (ref 3.4–10.8)

## 2023-08-26 LAB
ACTH PLAS-MCNC: 45.1 PG/ML (ref 7.2–63.3)
DHEA-S SERPL-MCNC: 123 UG/DL (ref 110–431.7)
ESTRADIOL SERPL-MCNC: 33.3 PG/ML
FSH SERPL-ACNC: 0.3 MIU/ML
LH SERPL-ACNC: 0.5 MIU/ML
PROLACTIN SERPL-MCNC: 20.1 NG/ML (ref 4.8–23.3)
T4 FREE SERPL-MCNC: 1.18 NG/DL (ref 0.82–1.77)
TSH SERPL DL<=0.005 MIU/L-ACNC: 1.62 UIU/ML (ref 0.45–4.5)

## 2023-08-28 ENCOUNTER — OFFICE VISIT (OUTPATIENT)
Age: 23
End: 2023-08-28
Payer: COMMERCIAL

## 2023-08-28 VITALS
WEIGHT: 170 LBS | BODY MASS INDEX: 25.76 KG/M2 | OXYGEN SATURATION: 98 % | HEART RATE: 74 BPM | TEMPERATURE: 97.8 F | DIASTOLIC BLOOD PRESSURE: 73 MMHG | HEIGHT: 68 IN | SYSTOLIC BLOOD PRESSURE: 109 MMHG | RESPIRATION RATE: 18 BRPM

## 2023-08-28 DIAGNOSIS — D59.10 AIHA (AUTOIMMUNE HEMOLYTIC ANEMIA) (HCC): Primary | ICD-10-CM

## 2023-08-28 PROCEDURE — 99214 OFFICE O/P EST MOD 30 MIN: CPT | Performed by: STUDENT IN AN ORGANIZED HEALTH CARE EDUCATION/TRAINING PROGRAM

## 2023-08-28 RX ORDER — DULOXETIN HYDROCHLORIDE 30 MG/1
30 CAPSULE, DELAYED RELEASE ORAL 2 TIMES DAILY
COMMUNITY
Start: 2023-08-21

## 2023-08-28 NOTE — PROGRESS NOTES
Maylin Daelr is a 23 y.o. female who presents for follow up of   Chief Complaint   Patient presents with    Follow-up     FEMI       The patient reports no new clinical symptoms or new complaints since last clinic evaluation. Labs were completed. Pt denies any abnormal bleeding. Pt does report some constipation. Pt overall is doing well. Pt was hospitalized  in June for a intestinal obstruction. Pt is doing better since then. No interval surgery or procedures reported    No reported new medication changes reported       Medications reviewed with the patient, and chart updated to reflect changes.

## 2023-08-28 NOTE — PROGRESS NOTES
[place] at [address] [facility] [address] [facility]] [address]   W: [phone] F: [phone]             Reason for Visit:     Char Gavin is a 23 y.o. female who is seen in consultation at the request of Dr. Chad Miramontes for evaluation of history of iron deficiency anemia, history of suspected AIHA. Hematology / Oncology Treatment History:        Hematological/Oncological Diagnosis: History of severe anemia, Autoimmune Hemolytic Anemia, Iron deficiency      Date of Diagnosis: 10/2022      Treatment course: Treated with IV iron in [place]              History of Present Illness:        23year old female with a relevant medical history of autoimmune disease, hashimoto's disease, SLE, history of iron deficiency anemia. She reportedly recently moved from [place] where she was seen to have a history of iron deficiency and severe  anemia back in 2019. The patient reported at one time she was hospitalized with severe anemia requiring blood transfusion x 2. Unclear cause of anemia as the patient denies any heavy menses. No other new clinical changes or current symptoms. Family history reviewed, non contributory   Social history reviewed, also non contributory         Review of Systems: A complete review of systems was obtained, negative except as described above. Interval History:     8/28/23    Doing well. No interval clinical worsening with regards to anemia or lupus flare. In good spirits.       Past Medical History:   Diagnosis Date    Anemia     Asthma     2001    Hashimoto's disease     2007    Lupus ([address] [facility])     PTSD (post-traumatic stress disorder)        Past Surgical History:   Procedure Laterality Date    TONSILLECTOMY      2016       Social History     Socioeconomic History    Marital status: Single     Spouse name: None    Number of children: None    Years of education: None    Highest education

## 2023-08-29 LAB
CORTIS AM PEAK SERPL-MCNC: 28.1 UG/DL (ref 6.2–19.4)
GAD65 AB SER IA-ACNC: <5 U/ML (ref 0–5)

## 2023-08-30 LAB — 17OHP SERPL-MCNC: 21 NG/DL

## 2023-09-03 LAB
MIS SERPL-MCNC: 2.02 NG/ML
TESTOST FREE SERPL-MCNC: 3.4 PG/ML (ref 0–4.2)
TESTOST SERPL-MCNC: 27.8 NG/DL (ref 10–55)

## 2023-09-08 DIAGNOSIS — N91.2 AMENORRHEA: Primary | ICD-10-CM

## 2023-09-09 LAB — 21HYDROXYLASE AB SER-ACNC: NEGATIVE

## 2023-09-10 LAB — ZNT8 AB SERPL IA-ACNC: <15 U/ML

## 2023-09-11 LAB — ISLET CELL512 AB SER-ACNC: <7.5 U/ML

## 2023-11-21 ENCOUNTER — OFFICE VISIT (OUTPATIENT)
Age: 23
End: 2023-11-21
Payer: COMMERCIAL

## 2023-11-21 VITALS
SYSTOLIC BLOOD PRESSURE: 110 MMHG | HEART RATE: 98 BPM | OXYGEN SATURATION: 98 % | HEIGHT: 68 IN | DIASTOLIC BLOOD PRESSURE: 72 MMHG | RESPIRATION RATE: 16 BRPM | WEIGHT: 168.4 LBS | BODY MASS INDEX: 25.52 KG/M2

## 2023-11-21 DIAGNOSIS — E03.9 HYPOTHYROIDISM, UNSPECIFIED TYPE: ICD-10-CM

## 2023-11-21 DIAGNOSIS — E03.9 HYPOTHYROIDISM, UNSPECIFIED TYPE: Primary | ICD-10-CM

## 2023-11-21 PROCEDURE — 99214 OFFICE O/P EST MOD 30 MIN: CPT | Performed by: INTERNAL MEDICINE

## 2023-11-21 NOTE — PROGRESS NOTES
Chief Complaint   Patient presents with    Thyroid Problem     History of Present Illness: Krupa Buenrostro is a 23 y.o. female with a past medical history significant for Hashimoto's thyroiditis, lupus, anemia seen for discussion related to Hashimoto's. \"Livier\"    Avril was diagnosed with Hashimoto's hypothyroidism at the age of 4. Currently taking namebrand Synthroid pending $140 per month. There is a very strong family history of type 1 diabetes in maternal aunt, maternal grandfather, maternal grandfather's brother. Additionally, nail has not experienced a menstrual period since the age of 10 or 14. Menarche occurred at age 13, initially regular but became very heavy with bad cramping. In college experienced an ovarian cyst and was begun on continuous OCP, currently taking Jimmy Scheuermann still did have ovarian cyst despite the use of this. Dose has been increased since that time. Denies hirsutism or acne. Mother experienced menopause in her late 45s. Maternal aunt did have ovarian cysts. In addition to Synthroid does take 10 mcg of Cytomel. Has been taking intermittent doses of prednisone for lupus flares, will be seeing new rheumatologist on the 14th.    11/20/2023: Has not taken prednisone for about 2 months and has lost 5 pounds since this time. Has family members and friends who have had thyroid ultrasounds but currently not experiencing dysphagia or dyspnea/no thyroid fullness. Does not want to discontinue OCP if possible.       Current Outpatient Medications   Medication Sig    DULoxetine (CYMBALTA) 30 MG extended release capsule Take 1 capsule by mouth 2 times daily    ondansetron (ZOFRAN-ODT) 4 MG disintegrating tablet TAKE 1 TABLET (4 MG TOTAL) BY MOUTH EVERY 8 HOURS AS NEEDED FOR NAUSEA AND VOMITING UP TO 30 DOSES    JUNEL 1.5/30 1.5-30 MG-MCG TABS Take 1 tablet by mouth daily    levothyroxine (SYNTHROID) 112 MCG tablet Take 1 tablet by mouth every morning    liothyronine (CYTOMEL) 5 MCG tablet Take 2

## 2024-02-23 ENCOUNTER — OFFICE VISIT (OUTPATIENT)
Age: 24
End: 2024-02-23
Payer: COMMERCIAL

## 2024-02-23 VITALS
WEIGHT: 164.8 LBS | DIASTOLIC BLOOD PRESSURE: 73 MMHG | RESPIRATION RATE: 16 BRPM | HEIGHT: 68 IN | SYSTOLIC BLOOD PRESSURE: 112 MMHG | HEART RATE: 83 BPM | BODY MASS INDEX: 24.98 KG/M2 | OXYGEN SATURATION: 94 %

## 2024-02-23 DIAGNOSIS — E03.9 HYPOTHYROIDISM, UNSPECIFIED TYPE: Primary | ICD-10-CM

## 2024-02-23 DIAGNOSIS — E03.9 HYPOTHYROIDISM, UNSPECIFIED TYPE: ICD-10-CM

## 2024-02-23 PROCEDURE — 99213 OFFICE O/P EST LOW 20 MIN: CPT | Performed by: INTERNAL MEDICINE

## 2024-02-23 NOTE — PROGRESS NOTES
Chief Complaint   Patient presents with    Thyroid Problem    Medication Refill     History of Present Illness: Daniela Carter is a 23 y.o. female with a past medical history significant for Hashimoto's thyroiditis, lupus, anemia seen for discussion related to Hashimoto's.    \"Livier\"    Avril was diagnosed with Hashimoto's hypothyroidism at the age of 6.  Currently taking namebrand Synthroid pending $140 per month.  There is a very strong family history of type 1 diabetes in maternal aunt, maternal grandfather, maternal grandfather's brother.  Additionally, nail has not experienced a menstrual period since the age of 14 or 15.  Menarche occurred at age 12, initially regular but became very heavy with bad cramping.  In college experienced an ovarian cyst and was begun on continuous OCP, currently taking Junel still did have ovarian cyst despite the use of this.  Dose has been increased since that time.  Denies hirsutism or acne.  Mother experienced menopause in her late 40s.  Maternal aunt did have ovarian cysts.  In addition to Synthroid does take 10 mcg of Cytomel.  Has been taking intermittent doses of prednisone for lupus flares, will be seeing new rheumatologist on the 14th.    11/20/2023: Has not taken prednisone for about 2 months and has lost 5 pounds since this time.  Has family members and friends who have had thyroid ultrasounds but currently not experiencing dysphagia or dyspnea/no thyroid fullness.  Does not want to discontinue OCP if possible.    02/23/2024: Taking 112mcg of synthroid- 10 mcg of cytomel- no panic/anxiety with this. Menses became irregular when skipping placebo.      Current Outpatient Medications   Medication Sig    DULoxetine (CYMBALTA) 30 MG extended release capsule Take 1 capsule by mouth 2 times daily    ondansetron (ZOFRAN-ODT) 4 MG disintegrating tablet TAKE 1 TABLET (4 MG TOTAL) BY MOUTH EVERY 8 HOURS AS NEEDED FOR NAUSEA AND VOMITING UP TO 30 DOSES    JUNEL 1.5/30 1.5-30 MG-MCG

## 2024-02-28 DIAGNOSIS — D59.10 AIHA (AUTOIMMUNE HEMOLYTIC ANEMIA) (HCC): ICD-10-CM

## 2024-03-01 ENCOUNTER — TELEPHONE (OUTPATIENT)
Age: 24
End: 2024-03-01

## 2024-03-01 NOTE — TELEPHONE ENCOUNTER
----- Message from JP Garcia - NP sent at 2/29/2024  2:38 PM EST -----  Ok to schedule.  ----- Message -----  From: Vandana Rothman  Sent: 2/13/2024   8:53 AM EST  To: JP Garcia - NP    PT would like to be contacted regarding NP appt.

## 2024-03-01 NOTE — TELEPHONE ENCOUNTER
Called PT to schedule for NP appt beginning 3/4/24 per Ana Luisa Shaikh Left  and requested a call back, provided office number

## 2024-03-08 LAB
ALBUMIN SERPL-MCNC: 4.8 G/DL (ref 4–5)
ALBUMIN/GLOB SERPL: 2.7 {RATIO} (ref 1.2–2.2)
ALP SERPL-CCNC: 60 IU/L (ref 44–121)
ALT SERPL-CCNC: 14 IU/L (ref 0–32)
AST SERPL-CCNC: 25 IU/L (ref 0–40)
BASOPHILS # BLD AUTO: 0.1 X10E3/UL (ref 0–0.2)
BASOPHILS NFR BLD AUTO: 1 %
BILIRUB SERPL-MCNC: 0.3 MG/DL (ref 0–1.2)
BUN SERPL-MCNC: 9 MG/DL (ref 6–20)
BUN/CREAT SERPL: 13 (ref 9–23)
CALCIUM SERPL-MCNC: 9.3 MG/DL (ref 8.7–10.2)
CHLORIDE SERPL-SCNC: 104 MMOL/L (ref 96–106)
CO2 SERPL-SCNC: 19 MMOL/L (ref 20–29)
CREAT SERPL-MCNC: 0.71 MG/DL (ref 0.57–1)
EGFRCR SERPLBLD CKD-EPI 2021: 121 ML/MIN/1.73
EOSINOPHIL # BLD AUTO: 0 X10E3/UL (ref 0–0.4)
EOSINOPHIL NFR BLD AUTO: 1 %
ERYTHROCYTE [DISTWIDTH] IN BLOOD BY AUTOMATED COUNT: 11.8 % (ref 11.7–15.4)
FERRITIN SERPL-MCNC: 39 NG/ML (ref 15–150)
GLOBULIN SER CALC-MCNC: 1.8 G/DL (ref 1.5–4.5)
GLUCOSE SERPL-MCNC: 99 MG/DL (ref 70–99)
HCT VFR BLD AUTO: 47.9 % (ref 34–46.6)
HGB BLD-MCNC: 16 G/DL (ref 11.1–15.9)
IMM GRANULOCYTES # BLD AUTO: 0 X10E3/UL (ref 0–0.1)
IMM GRANULOCYTES NFR BLD AUTO: 0 %
IRON SATN MFR SERPL: 35 % (ref 15–55)
IRON SERPL-MCNC: 148 UG/DL (ref 27–159)
LYMPHOCYTES # BLD AUTO: 1.6 X10E3/UL (ref 0.7–3.1)
LYMPHOCYTES NFR BLD AUTO: 37 %
MCH RBC QN AUTO: 30.4 PG (ref 26.6–33)
MCHC RBC AUTO-ENTMCNC: 33.4 G/DL (ref 31.5–35.7)
MCV RBC AUTO: 91 FL (ref 79–97)
MONOCYTES # BLD AUTO: 0.5 X10E3/UL (ref 0.1–0.9)
MONOCYTES NFR BLD AUTO: 11 %
NEUTROPHILS # BLD AUTO: 2.1 X10E3/UL (ref 1.4–7)
NEUTROPHILS NFR BLD AUTO: 50 %
PLATELET # BLD AUTO: 266 X10E3/UL (ref 150–450)
POTASSIUM SERPL-SCNC: 4.7 MMOL/L (ref 3.5–5.2)
PROT SERPL-MCNC: 6.6 G/DL (ref 6–8.5)
RBC # BLD AUTO: 5.26 X10E6/UL (ref 3.77–5.28)
SODIUM SERPL-SCNC: 141 MMOL/L (ref 134–144)
TIBC SERPL-MCNC: 417 UG/DL (ref 250–450)
UIBC SERPL-MCNC: 269 UG/DL (ref 131–425)
WBC # BLD AUTO: 4.2 X10E3/UL (ref 3.4–10.8)

## 2024-03-18 ENCOUNTER — OFFICE VISIT (OUTPATIENT)
Age: 24
End: 2024-03-18
Payer: COMMERCIAL

## 2024-03-18 VITALS
BODY MASS INDEX: 24.86 KG/M2 | DIASTOLIC BLOOD PRESSURE: 68 MMHG | HEART RATE: 80 BPM | TEMPERATURE: 98.4 F | SYSTOLIC BLOOD PRESSURE: 101 MMHG | RESPIRATION RATE: 18 BRPM | OXYGEN SATURATION: 98 % | HEIGHT: 68 IN | WEIGHT: 164 LBS

## 2024-03-18 DIAGNOSIS — R79.89 OTHER SPECIFIED ABNORMAL FINDINGS OF BLOOD CHEMISTRY: ICD-10-CM

## 2024-03-18 DIAGNOSIS — D59.10 AIHA (AUTOIMMUNE HEMOLYTIC ANEMIA) (HCC): Primary | ICD-10-CM

## 2024-03-18 DIAGNOSIS — D50.9 IRON DEFICIENCY ANEMIA, UNSPECIFIED IRON DEFICIENCY ANEMIA TYPE: ICD-10-CM

## 2024-03-18 PROCEDURE — 99214 OFFICE O/P EST MOD 30 MIN: CPT | Performed by: STUDENT IN AN ORGANIZED HEALTH CARE EDUCATION/TRAINING PROGRAM

## 2024-03-18 RX ORDER — CLONIDINE HYDROCHLORIDE 0.1 MG/1
TABLET ORAL
COMMUNITY
Start: 2023-10-16

## 2024-03-18 NOTE — PROGRESS NOTES
[facility] [place] [facility]   [address] [facility] [address] [address], [place] [address]   W: [phone] F: [phone]             Reason for Visit:     Daniela Carter is a 22 y.o. female who is seen in consultation at the request of Dr. Vann for evaluation of history of iron deficiency anemia, history of suspected AIHA.             Hematology / Oncology Treatment History:        Hematological/Oncological Diagnosis: History of severe anemia, Autoimmune Hemolytic Anemia, Iron deficiency      Date of Diagnosis: 10/2022      Treatment course: Treated with IV iron in [place]              History of Present Illness:        22 year old female with a relevant medical history of autoimmune disease, hashimoto's disease, SLE, history of iron deficiency anemia.  She reportedly recently moved from [place] where she was seen to have a history of iron deficiency and severe  anemia back in 2019.  The patient reported at one time she was hospitalized with severe anemia requiring blood transfusion x 2.  Unclear cause of anemia as the patient denies any heavy menses.  No other new clinical changes or current symptoms.       Family history reviewed, non contributory   Social history reviewed, also non contributory         Review of Systems: A complete review of systems was obtained, negative except as described above.    Interval History:     3/18/24    Doing well. No interval clinical worsening with regards to anemia or lupus flare. In good spirits.      Past Medical History:   Diagnosis Date    Anemia     Asthma     2001    Hashimoto's disease     2007    Lupus (HCC)     PTSD (post-traumatic stress disorder)        Past Surgical History:   Procedure Laterality Date    TONSILLECTOMY      2016       Social History     Socioeconomic History    Marital status: Single   Tobacco Use    Smoking status: Never    Smokeless tobacco: Never   Vaping Use    Vaping Use: Never

## 2024-03-18 NOTE — PROGRESS NOTES
Daniela Carter is a 23 y.o. female who presents for follow up of   Chief Complaint   Patient presents with    Follow-up     AIHA       MsKerrie Carter is here today for a lab follow up. She reports no new clinical symptoms or new complaints since last clinic evaluation. She continues to have some fatigue and easy bruising. She reports she is feeling a a lot better and has been trying to get a lot of her iron via her diet.      No interval hospitalizations reported    No interval surgery or procedures reported    No reported new medication changes reported       Medications reviewed with the patient, and chart updated to reflect changes.

## 2024-03-28 ENCOUNTER — OFFICE VISIT (OUTPATIENT)
Age: 24
End: 2024-03-28
Payer: COMMERCIAL

## 2024-03-28 VITALS
TEMPERATURE: 97.7 F | SYSTOLIC BLOOD PRESSURE: 101 MMHG | BODY MASS INDEX: 25 KG/M2 | DIASTOLIC BLOOD PRESSURE: 70 MMHG | OXYGEN SATURATION: 98 % | HEART RATE: 98 BPM | RESPIRATION RATE: 16 BRPM | WEIGHT: 164.4 LBS

## 2024-03-28 DIAGNOSIS — G89.29 CHRONIC BILATERAL LOW BACK PAIN WITHOUT SCIATICA: ICD-10-CM

## 2024-03-28 DIAGNOSIS — M79.641 BILATERAL HAND PAIN: ICD-10-CM

## 2024-03-28 DIAGNOSIS — M32.9 HISTORY OF SYSTEMIC LUPUS ERYTHEMATOSUS (HCC): Primary | ICD-10-CM

## 2024-03-28 DIAGNOSIS — M54.2 NECK PAIN: ICD-10-CM

## 2024-03-28 DIAGNOSIS — M79.642 BILATERAL HAND PAIN: ICD-10-CM

## 2024-03-28 DIAGNOSIS — M54.50 CHRONIC BILATERAL LOW BACK PAIN WITHOUT SCIATICA: ICD-10-CM

## 2024-03-28 DIAGNOSIS — I73.00 RAYNAUD'S DISEASE WITHOUT GANGRENE: ICD-10-CM

## 2024-03-28 DIAGNOSIS — R21 RASH AND NONSPECIFIC SKIN ERUPTION: ICD-10-CM

## 2024-03-28 PROCEDURE — 99204 OFFICE O/P NEW MOD 45 MIN: CPT | Performed by: NURSE PRACTITIONER

## 2024-03-28 RX ORDER — HYDROXYCHLOROQUINE SULFATE 200 MG/1
200 TABLET, FILM COATED ORAL 2 TIMES DAILY
Qty: 180 TABLET | Refills: 0 | Status: SHIPPED | OUTPATIENT
Start: 2024-03-28 | End: 2024-06-26

## 2024-03-28 ASSESSMENT — ENCOUNTER SYMPTOMS
SORE THROAT: 0
TROUBLE SWALLOWING: 0
COUGH: 0
EYE REDNESS: 0
CONSTIPATION: 1
DIARRHEA: 0
SHORTNESS OF BREATH: 0
EYE PAIN: 0
BLOOD IN STOOL: 0
COLOR CHANGE: 1
VOMITING: 0
ABDOMINAL PAIN: 0
NAUSEA: 0

## 2024-03-28 ASSESSMENT — PATIENT HEALTH QUESTIONNAIRE - PHQ9
SUM OF ALL RESPONSES TO PHQ QUESTIONS 1-9: 0
SUM OF ALL RESPONSES TO PHQ QUESTIONS 1-9: 0
SUM OF ALL RESPONSES TO PHQ9 QUESTIONS 1 & 2: 0
1. LITTLE INTEREST OR PLEASURE IN DOING THINGS: NOT AT ALL
SUM OF ALL RESPONSES TO PHQ QUESTIONS 1-9: 0
2. FEELING DOWN, DEPRESSED OR HOPELESS: NOT AT ALL
SUM OF ALL RESPONSES TO PHQ QUESTIONS 1-9: 0

## 2024-03-28 NOTE — PROGRESS NOTES
Chief Complaint   Patient presents with    New Patient     1. Have you been to the ER, urgent care clinic since your last visit?  Hospitalized since your last visit?No    2. Have you seen or consulted any other health care providers outside of the [facility] since your last visit?  Include any pap smears or colon screening.  yes  at [facility]

## 2024-03-28 NOTE — PROGRESS NOTES
Subjective:      Patient ID: Daniela Carter is a 23 y.o. female.    HPI  Patient is a 23 year old female being seen at the request of Dr. Vann to be evaluated for lupus. She reports for several years she was seeing a rheumatologist in [place], [place]. She states she didn't test positive for lupus and sjogren's, but she had symptoms so they called it lupus. She states at the time she had shoulder, neck and low back pain, with normal x-rays, and she also had fatigue, fevers, malar rash and stomach upset. She was started on Sulfasalazine and Plaquenil. She reports the summer of 2023 Sulfasalzine was stopped because she had side effects including facial rash, facial flushing, fevers, hot flashes and dizziness. She continues to take Plaquenil 200mg BID. Her last eye exam was 2 weeks ago and she is scheduled to return again for a medical exam shortly. She sees Dr. Navarro at [facility]. Prior to that, her last eye exam was in 2022. She has a family history of RA in paternal grandfather and her father and maternal aunt have raynaud's. She reports she has raynaud's as well. She denies sicca symptoms. She currently denies rash, but states she'll get a rash in a malar distribution in the sun or when she is sick.   She reports she gets pain and tightness in her neck and back. She stretches and she does her PT exercises with relief. She states she tries to stay as active as possible and also does yoga and gets monthly massages. She states she doesn't take any medication for her neck and back discomfort. A week ago her cat injured her right thumb and she went to urgent care 2 days ago and was diagnosed with a tendon injury; she is using Ibuprofen for a week and is wearing a brace and will follow up with ortho if no improvement. Other than that, she denies joint pain and swelling. She denies morning stiffness; she state she has that during the winter.     Review of Systems   Constitutional:  Negative for chills and fever.

## 2024-03-29 LAB
APPEARANCE UR: CLEAR
BACTERIA #/AREA URNS HPF: NORMAL /[HPF]
BILIRUB UR QL STRIP: NEGATIVE
C3 SERPL-MCNC: 124 MG/DL (ref 82–167)
C4 SERPL-MCNC: 17 MG/DL (ref 12–38)
CASTS URNS QL MICRO: NORMAL /LPF
CCP IGA+IGG SERPL IA-ACNC: 5 UNITS (ref 0–19)
COLOR UR: YELLOW
CREAT UR-MCNC: 91.2 MG/DL
CRP SERPL-MCNC: 2 MG/L (ref 0–10)
ENA RNP AB SER-ACNC: 0.2 AI (ref 0–0.9)
ENA SM AB SER-ACNC: <0.2 AI (ref 0–0.9)
ENA SS-A AB SER-ACNC: <0.2 AI (ref 0–0.9)
ENA SS-B AB SER-ACNC: <0.2 AI (ref 0–0.9)
EPI CELLS #/AREA URNS HPF: NORMAL /HPF (ref 0–10)
ERYTHROCYTE [SEDIMENTATION RATE] IN BLOOD BY WESTERGREN METHOD: 2 MM/HR (ref 0–32)
GLUCOSE UR QL STRIP: NEGATIVE
HAV IGM SERPL QL IA: NEGATIVE
HBV CORE IGM SERPL QL IA: NEGATIVE
HBV SURFACE AG SERPL QL IA: NEGATIVE
HCV AB SERPL QL IA: NORMAL
HCV IGG SERPL QL IA: NON REACTIVE
HGB UR QL STRIP: NEGATIVE
KETONES UR QL STRIP: NEGATIVE
LEUKOCYTE ESTERASE UR QL STRIP: NEGATIVE
MICRO URNS: ABNORMAL
MICRO URNS: ABNORMAL
NITRITE UR QL STRIP: NEGATIVE
PH UR STRIP: 8.5 [PH] (ref 5–7.5)
PROT UR QL STRIP: NEGATIVE
PROT UR-MCNC: 6 MG/DL
PROT/CREAT UR: 66 MG/G CREAT (ref 0–200)
RBC #/AREA URNS HPF: NORMAL /HPF (ref 0–2)
SP GR UR STRIP: 1.01 (ref 1–1.03)
UROBILINOGEN UR STRIP-MCNC: 0.2 MG/DL (ref 0.2–1)
WBC #/AREA URNS HPF: NORMAL /HPF (ref 0–5)

## 2024-04-01 LAB
ANA SER QL IF: NEGATIVE
ENA SCL70 AB SER QL IA: <20 UNITS

## 2024-04-02 LAB — RHEUMATOID FACT SERPL-ACNC: <14 IU/ML

## 2024-04-04 LAB — HLA-B27 QL NAA+PROBE: NEGATIVE

## 2024-04-05 LAB — DSDNA AB SER FARR-ACNC: <8 IU/ML

## 2024-04-08 LAB
GAMMA INTERFERON BACKGROUND BLD IA-ACNC: 0 IU/ML
M TB IFN-G BLD-IMP: NEGATIVE
M TB IFN-G CD4+ BCKGRND COR BLD-ACNC: 0.01 IU/ML
M TB IFN-G CD4+CD8+ BCKGRND COR BLD-ACNC: 0.01 IU/ML
MITOGEN IGNF BCKGRD COR BLD-ACNC: >10 IU/ML
SERVICE CMNT-IMP: NORMAL

## 2024-05-25 RX ORDER — LEVOTHYROXINE SODIUM 112 MCG
112 TABLET ORAL EVERY MORNING
Qty: 90 TABLET | Refills: 3 | Status: SHIPPED | OUTPATIENT
Start: 2024-05-25

## 2024-06-17 RX ORDER — LIOTHYRONINE SODIUM 5 UG/1
10 TABLET ORAL DAILY
Qty: 180 TABLET | Refills: 2 | Status: SHIPPED | OUTPATIENT
Start: 2024-06-17

## 2024-08-02 ENCOUNTER — OFFICE VISIT (OUTPATIENT)
Age: 24
End: 2024-08-02
Payer: COMMERCIAL

## 2024-08-02 VITALS
DIASTOLIC BLOOD PRESSURE: 83 MMHG | OXYGEN SATURATION: 98 % | HEART RATE: 87 BPM | BODY MASS INDEX: 24.63 KG/M2 | SYSTOLIC BLOOD PRESSURE: 119 MMHG | RESPIRATION RATE: 16 BRPM | TEMPERATURE: 98.3 F | WEIGHT: 162 LBS

## 2024-08-02 DIAGNOSIS — Z51.81 MEDICATION MONITORING ENCOUNTER: ICD-10-CM

## 2024-08-02 DIAGNOSIS — R21 RASH AND NONSPECIFIC SKIN ERUPTION: ICD-10-CM

## 2024-08-02 DIAGNOSIS — M19.90 INFLAMMATORY ARTHRITIS: Primary | ICD-10-CM

## 2024-08-02 DIAGNOSIS — M19.90 INFLAMMATORY ARTHRITIS: ICD-10-CM

## 2024-08-02 PROCEDURE — 99213 OFFICE O/P EST LOW 20 MIN: CPT | Performed by: NURSE PRACTITIONER

## 2024-08-02 ASSESSMENT — ENCOUNTER SYMPTOMS
SHORTNESS OF BREATH: 0
COUGH: 0
NAUSEA: 1
BLOOD IN STOOL: 0
TROUBLE SWALLOWING: 0
EYE PAIN: 0
EYE REDNESS: 0
DIARRHEA: 0
SORE THROAT: 0
CONSTIPATION: 0
VOMITING: 0
ABDOMINAL PAIN: 0

## 2024-08-02 ASSESSMENT — PATIENT HEALTH QUESTIONNAIRE - PHQ9
SUM OF ALL RESPONSES TO PHQ QUESTIONS 1-9: 0
1. LITTLE INTEREST OR PLEASURE IN DOING THINGS: NOT AT ALL
SUM OF ALL RESPONSES TO PHQ9 QUESTIONS 1 & 2: 0
SUM OF ALL RESPONSES TO PHQ QUESTIONS 1-9: 0
2. FEELING DOWN, DEPRESSED OR HOPELESS: NOT AT ALL

## 2024-08-02 NOTE — PATIENT INSTRUCTIONS
Thank you for visiting [facility]!      For future medication refills, we require updated lab results and an upcoming appointment to be in our system prior to refilling prescriptions.  Without these two things, your refill will be DENIED.  If you miss your upcoming appointment, your refill will also be DENIED.      We appreciate your understanding of this critical clinical aspect of our practice. -Dr. Fisher & Ana Luisa, NP     Please decrease your Plaquenil 200mg to once a day.    Please schedule a Plaquenil eye exam.    Return to the clinic in 4 months.

## 2024-08-02 NOTE — PROGRESS NOTES
Subjective:      Patient ID: Daniela Carter is a 23 y.o. female.    3/28/2024 Hep panel nl,   3/28/2024 LAWRENCE neg, RNP 0.2, Weller <0.2, SSA <0.2, SSB <0.2, HLA B27 negative, RF <14, CCP 5    HPI  Patient is a 23 year old female here for a follow up visit. We reviewed her labs and is aware that her autoimmune work up is normal.  She is currently taking Plaquenil 200mg BID.  She reports for several years she was seeing a rheumatologist in [place], [place]. She states she didn't test positive for lupus and sjogren's, but she had symptoms so they called it lupus. She reports right thumb pain and neck pain. She denies swollen joints.She reports she gets a rash on her face and chest. She did not make an appointment with dermatology. She is wearing her sunscreen every day. She denies infections or antibiotics since her last visit.     Review of Systems   Constitutional:  Positive for chills. Negative for fever.   HENT:  Negative for mouth sores, sore throat and trouble swallowing.         Denies dry mouth  Denies nasal sores  Reports she had some sores on her tongues last week that resolved quickly   Eyes:  Negative for pain and redness.        Denies dry eyes   Respiratory:  Negative for cough and shortness of breath.    Cardiovascular:  Negative for chest pain.   Gastrointestinal:  Positive for nausea. Negative for abdominal pain, blood in stool, constipation, diarrhea and vomiting.   Genitourinary:  Negative for dysuria and hematuria.   Musculoskeletal:  Negative for arthralgias and joint swelling.   Skin:  Positive for rash.   Neurological:  Positive for numbness (in her feet at night).     Current Outpatient Medications   Medication Sig Dispense Refill    liothyronine (CYTOMEL) 5 MCG tablet TAKE 2 TABLETS BY MOUTH EVERY  tablet 2    SYNTHROID 112 MCG tablet TAKE 1 TABLET EVERY MORNING 90 tablet 3    cloNIDine (CATAPRES) 0.1 MG tablet       DULoxetine (CYMBALTA) 30 MG extended release capsule Take 1 capsule by mouth

## 2024-08-02 NOTE — PROGRESS NOTES
Chief Complaint   Patient presents with    Joint Pain     1. Have you been to the ER, urgent care clinic since your last visit?  Hospitalized since your last visit?No    2. Have you seen or consulted any other health care providers outside of the [facility] since your last visit?  Include any pap smears or colon screening.  Yes [facility]

## 2024-08-07 LAB
ALBUMIN SERPL-MCNC: 4.6 G/DL (ref 4–5)
ALP SERPL-CCNC: 50 IU/L (ref 44–121)
ALT SERPL-CCNC: 17 IU/L (ref 0–32)
AST SERPL-CCNC: 30 IU/L (ref 0–40)
BASOPHILS # BLD AUTO: 0.1 X10E3/UL (ref 0–0.2)
BASOPHILS NFR BLD AUTO: 1 %
BILIRUB SERPL-MCNC: 0.3 MG/DL (ref 0–1.2)
BUN SERPL-MCNC: 8 MG/DL (ref 6–20)
BUN/CREAT SERPL: 11 (ref 9–23)
CALCIUM SERPL-MCNC: 9.8 MG/DL (ref 8.7–10.2)
CHLORIDE SERPL-SCNC: 96 MMOL/L (ref 96–106)
CO2 SERPL-SCNC: 24 MMOL/L (ref 20–29)
CREAT SERPL-MCNC: 0.7 MG/DL (ref 0.57–1)
CRP SERPL-MCNC: 5 MG/L (ref 0–10)
EGFRCR SERPLBLD CKD-EPI 2021: 123 ML/MIN/1.73
EOSINOPHIL # BLD AUTO: 0.1 X10E3/UL (ref 0–0.4)
EOSINOPHIL NFR BLD AUTO: 2 %
ERYTHROCYTE [DISTWIDTH] IN BLOOD BY AUTOMATED COUNT: 11.6 % (ref 11.7–15.4)
ERYTHROCYTE [SEDIMENTATION RATE] IN BLOOD BY WESTERGREN METHOD: 4 MM/HR (ref 0–32)
GLOBULIN SER CALC-MCNC: 2.2 G/DL (ref 1.5–4.5)
GLUCOSE SERPL-MCNC: 77 MG/DL (ref 70–99)
HCT VFR BLD AUTO: 46.1 % (ref 34–46.6)
HGB BLD-MCNC: 15.5 G/DL (ref 11.1–15.9)
IMM GRANULOCYTES # BLD AUTO: 0 X10E3/UL (ref 0–0.1)
IMM GRANULOCYTES NFR BLD AUTO: 0 %
LYMPHOCYTES # BLD AUTO: 2.2 X10E3/UL (ref 0.7–3.1)
LYMPHOCYTES NFR BLD AUTO: 45 %
MCH RBC QN AUTO: 30.5 PG (ref 26.6–33)
MCHC RBC AUTO-ENTMCNC: 33.6 G/DL (ref 31.5–35.7)
MCV RBC AUTO: 91 FL (ref 79–97)
MONOCYTES # BLD AUTO: 0.5 X10E3/UL (ref 0.1–0.9)
MONOCYTES NFR BLD AUTO: 10 %
NEUTROPHILS # BLD AUTO: 2 X10E3/UL (ref 1.4–7)
NEUTROPHILS NFR BLD AUTO: 42 %
PLATELET # BLD AUTO: 249 X10E3/UL (ref 150–450)
POTASSIUM SERPL-SCNC: 4.9 MMOL/L (ref 3.5–5.2)
PROT SERPL-MCNC: 6.8 G/DL (ref 6–8.5)
RBC # BLD AUTO: 5.08 X10E6/UL (ref 3.77–5.28)
SODIUM SERPL-SCNC: 134 MMOL/L (ref 134–144)
WBC # BLD AUTO: 4.9 X10E3/UL (ref 3.4–10.8)

## 2024-08-23 ENCOUNTER — OFFICE VISIT (OUTPATIENT)
Age: 24
End: 2024-08-23
Payer: COMMERCIAL

## 2024-08-23 VITALS
WEIGHT: 164 LBS | HEART RATE: 60 BPM | RESPIRATION RATE: 16 BRPM | BODY MASS INDEX: 34.43 KG/M2 | DIASTOLIC BLOOD PRESSURE: 65 MMHG | HEIGHT: 58 IN | SYSTOLIC BLOOD PRESSURE: 106 MMHG

## 2024-08-23 DIAGNOSIS — E03.9 HYPOTHYROIDISM, UNSPECIFIED TYPE: Primary | ICD-10-CM

## 2024-08-23 DIAGNOSIS — E03.9 HYPOTHYROIDISM, UNSPECIFIED TYPE: ICD-10-CM

## 2024-08-23 DIAGNOSIS — D84.9 IMMUNOSUPPRESSION (HCC): ICD-10-CM

## 2024-08-23 PROCEDURE — 99214 OFFICE O/P EST MOD 30 MIN: CPT | Performed by: INTERNAL MEDICINE

## 2024-08-23 PROCEDURE — G2211 COMPLEX E/M VISIT ADD ON: HCPCS | Performed by: INTERNAL MEDICINE

## 2024-08-23 NOTE — PROGRESS NOTES
Chief Complaint   Patient presents with    Thyroid Problem     History of Present Illness: Daniela Carter is a 23 y.o. female with a past medical history significant for Hashimoto's thyroiditis, lupus, anemia seen for discussion related to Hashimoto's.    \"Livier\"    Avril was diagnosed with Hashimoto's hypothyroidism at the age of 6.  Currently taking namebrand Synthroid pending $140 per month.  There is a very strong family history of type 1 diabetes in maternal aunt, maternal grandfather, maternal grandfather's brother.  Additionally, nail has not experienced a menstrual period since the age of 14 or 15.  Menarche occurred at age 12, initially regular but became very heavy with bad cramping.  In college experienced an ovarian cyst and was begun on continuous OCP, currently taking Junel still did have ovarian cyst despite the use of this.  Dose has been increased since that time.  Denies hirsutism or acne.  Mother experienced menopause in her late 40s.  Maternal aunt did have ovarian cysts.  In addition to Synthroid does take 10 mcg of Cytomel.  Has been taking intermittent doses of prednisone for lupus flares, will be seeing new rheumatologist on the 14th.    11/20/2023: Has not taken prednisone for about 2 months and has lost 5 pounds since this time.  Has family members and friends who have had thyroid ultrasounds but currently not experiencing dysphagia or dyspnea/no thyroid fullness.  Does not want to discontinue OCP if possible.    02/23/2024: Taking 112mcg of synthroid- 10 mcg of cytomel- no panic/anxiety with this. Menses became irregular when skipping placebo.    08/23/2024: Applying for law school - has concerns surrounding viruses in the office related to immunosuppression.  Tolerating Cytomel well, no anxiety or palpitations.  Takes the Cytomel 2 hours after eating.  New rash on the face, erythematous with flaking.    Current Outpatient Medications   Medication Sig    liothyronine (CYTOMEL) 5 MCG tablet

## 2024-08-30 LAB
T4 FREE SERPL-MCNC: 1.11 NG/DL (ref 0.82–1.77)
TSH SERPL DL<=0.005 MIU/L-ACNC: 1.05 UIU/ML (ref 0.45–4.5)

## 2025-03-12 LAB
ALBUMIN SERPL-MCNC: 4.3 G/DL (ref 4–5)
ALP SERPL-CCNC: 48 IU/L (ref 44–121)
ALT SERPL-CCNC: 18 IU/L (ref 0–32)
AST SERPL-CCNC: 34 IU/L (ref 0–40)
BASOPHILS # BLD AUTO: 0.1 X10E3/UL (ref 0–0.2)
BASOPHILS NFR BLD AUTO: 1 %
BILIRUB SERPL-MCNC: 0.3 MG/DL (ref 0–1.2)
BUN SERPL-MCNC: 8 MG/DL (ref 6–20)
BUN/CREAT SERPL: 9 (ref 9–23)
CALCIUM SERPL-MCNC: 9.3 MG/DL (ref 8.7–10.2)
CHLORIDE SERPL-SCNC: 100 MMOL/L (ref 96–106)
CO2 SERPL-SCNC: 20 MMOL/L (ref 20–29)
CREAT SERPL-MCNC: 0.86 MG/DL (ref 0.57–1)
EGFRCR SERPLBLD CKD-EPI 2021: 95 ML/MIN/1.73
EOSINOPHIL # BLD AUTO: 0 X10E3/UL (ref 0–0.4)
EOSINOPHIL NFR BLD AUTO: 0 %
ERYTHROCYTE [DISTWIDTH] IN BLOOD BY AUTOMATED COUNT: 11.3 % (ref 11.7–15.4)
FERRITIN SERPL-MCNC: 42 NG/ML (ref 15–150)
GLOBULIN SER CALC-MCNC: 2 G/DL (ref 1.5–4.5)
GLUCOSE SERPL-MCNC: 77 MG/DL (ref 70–99)
HAPTOGLOB SERPL-MCNC: 24 MG/DL (ref 33–278)
HCT VFR BLD AUTO: 44.7 % (ref 34–46.6)
HGB BLD-MCNC: 15.3 G/DL (ref 11.1–15.9)
IMM GRANULOCYTES # BLD AUTO: 0 X10E3/UL (ref 0–0.1)
IMM GRANULOCYTES NFR BLD AUTO: 0 %
IRON SATN MFR SERPL: 18 % (ref 15–55)
IRON SERPL-MCNC: 80 UG/DL (ref 27–159)
LYMPHOCYTES # BLD AUTO: 1.9 X10E3/UL (ref 0.7–3.1)
LYMPHOCYTES NFR BLD AUTO: 39 %
MCH RBC QN AUTO: 31.3 PG (ref 26.6–33)
MCHC RBC AUTO-ENTMCNC: 34.2 G/DL (ref 31.5–35.7)
MCV RBC AUTO: 91 FL (ref 79–97)
MONOCYTES # BLD AUTO: 0.5 X10E3/UL (ref 0.1–0.9)
MONOCYTES NFR BLD AUTO: 9 %
NEUTROPHILS # BLD AUTO: 2.5 X10E3/UL (ref 1.4–7)
NEUTROPHILS NFR BLD AUTO: 51 %
PLATELET # BLD AUTO: 244 X10E3/UL (ref 150–450)
POTASSIUM SERPL-SCNC: 4.7 MMOL/L (ref 3.5–5.2)
PROT SERPL-MCNC: 6.3 G/DL (ref 6–8.5)
RBC # BLD AUTO: 4.89 X10E6/UL (ref 3.77–5.28)
SODIUM SERPL-SCNC: 137 MMOL/L (ref 134–144)
TIBC SERPL-MCNC: 435 UG/DL (ref 250–450)
UIBC SERPL-MCNC: 355 UG/DL (ref 131–425)
WBC # BLD AUTO: 5 X10E3/UL (ref 3.4–10.8)

## 2025-03-17 ENCOUNTER — OFFICE VISIT (OUTPATIENT)
Age: 25
End: 2025-03-17
Payer: COMMERCIAL

## 2025-03-17 VITALS
HEART RATE: 87 BPM | RESPIRATION RATE: 18 BRPM | BODY MASS INDEX: 34 KG/M2 | HEIGHT: 58 IN | SYSTOLIC BLOOD PRESSURE: 123 MMHG | TEMPERATURE: 98.2 F | DIASTOLIC BLOOD PRESSURE: 84 MMHG | OXYGEN SATURATION: 98 % | WEIGHT: 162 LBS

## 2025-03-17 DIAGNOSIS — D59.10 AIHA (AUTOIMMUNE HEMOLYTIC ANEMIA): Primary | ICD-10-CM

## 2025-03-17 DIAGNOSIS — D50.9 IRON DEFICIENCY ANEMIA, UNSPECIFIED IRON DEFICIENCY ANEMIA TYPE: ICD-10-CM

## 2025-03-17 PROCEDURE — 99214 OFFICE O/P EST MOD 30 MIN: CPT | Performed by: STUDENT IN AN ORGANIZED HEALTH CARE EDUCATION/TRAINING PROGRAM

## 2025-03-17 RX ORDER — HYDROXYCHLOROQUINE SULFATE 200 MG/1
200 TABLET, FILM COATED ORAL DAILY
COMMUNITY

## 2025-03-17 NOTE — PROGRESS NOTES
Daniela Carter is a 24 y.o. female who presents for follow up of   Chief Complaint   Patient presents with    Follow-up     AIHA (autoimmune hemolytic anemia)       The patient reports no new clinical symptoms or new complaints since last clinic evaluation. She is doing well with no significant changes.  She has been dealing with some mild bruises on her upper arm with sweating ,tremors and dizziness and then it passes. She is here today for a lab follow up.     She was in urgent care for a sinus infection in January.     No interval surgery or procedures reported    No reported new medication changes reported       Medications reviewed with the patient, and chart updated to reflect changes.

## 2025-03-17 NOTE — PROGRESS NOTES
[facility] [place] [facility]   [address] [address], [place] [address]   W: [phone] F: [phone]             Reason for Visit:     Daniela Carter is a 22 y.o. female who is seen in consultation at the request of Dr. Vann for evaluation of history of iron deficiency anemia, history of suspected AIHA.             Hematology / Oncology Treatment History:        Hematological/Oncological Diagnosis: History of severe anemia, Autoimmune Hemolytic Anemia, Iron deficiency      Date of Diagnosis: 10/2022      Treatment course: Treated with IV iron in [place]              History of Present Illness:        22 year old female with a relevant medical history of autoimmune disease, hashimoto's disease, SLE, history of iron deficiency anemia.  She reportedly recently moved from [place] where she was seen to have a history of iron deficiency and severe  anemia back in 2019.  The patient reported at one time she was hospitalized with severe anemia requiring blood transfusion x 2.  Unclear cause of anemia as the patient denies any heavy menses.  No other new clinical changes or current symptoms.       Family history reviewed, non contributory   Social history reviewed, also non contributory         Review of Systems: A complete review of systems was obtained, negative except as described above.    Interval History:     3/17/25    Doing well. No interval clinical worsening with regards to anemia or lupus flare. In good spirits.      Past Medical History:   Diagnosis Date    Anemia     Asthma     2001    Hashimoto's disease     2007    Lupus (HCC)     PTSD (post-traumatic stress disorder)        Past Surgical History:   Procedure Laterality Date    TONSILLECTOMY      2016       Social History     Socioeconomic History    Marital status: Single   Tobacco Use    Smoking status: Never    Smokeless tobacco: Never   Vaping Use    Vaping status: Never

## 2025-03-18 DIAGNOSIS — R79.89 OTHER SPECIFIED ABNORMAL FINDINGS OF BLOOD CHEMISTRY: ICD-10-CM

## 2025-03-18 DIAGNOSIS — D50.9 IRON DEFICIENCY ANEMIA, UNSPECIFIED IRON DEFICIENCY ANEMIA TYPE: ICD-10-CM

## 2025-03-18 DIAGNOSIS — D59.10 AIHA (AUTOIMMUNE HEMOLYTIC ANEMIA): ICD-10-CM

## 2025-08-06 RX ORDER — LIOTHYRONINE SODIUM 5 UG/1
10 TABLET ORAL DAILY
Qty: 180 TABLET | Refills: 2 | Status: SHIPPED | OUTPATIENT
Start: 2025-08-06